# Patient Record
Sex: FEMALE | Race: WHITE | NOT HISPANIC OR LATINO | URBAN - METROPOLITAN AREA
[De-identification: names, ages, dates, MRNs, and addresses within clinical notes are randomized per-mention and may not be internally consistent; named-entity substitution may affect disease eponyms.]

---

## 2023-07-26 ENCOUNTER — TELEMEDICINE (OUTPATIENT)
Dept: PRIMARY CARE | Facility: CLINIC | Age: 43
End: 2023-07-26
Payer: COMMERCIAL

## 2023-07-26 DIAGNOSIS — L03.011 CELLULITIS OF FINGER OF RIGHT HAND: Primary | ICD-10-CM

## 2023-07-26 PROCEDURE — 99213 OFFICE O/P EST LOW 20 MIN: CPT | Performed by: FAMILY MEDICINE

## 2023-07-26 RX ORDER — CEPHALEXIN 250 MG/1
250 CAPSULE ORAL 3 TIMES DAILY
Qty: 21 CAPSULE | Refills: 0 | Status: SHIPPED | OUTPATIENT
Start: 2023-07-26 | End: 2023-08-02

## 2023-07-26 NOTE — PROGRESS NOTES
S: right index finger : reddish purple , tender.  Started as blister, covered with blister bad aid, when band aid was removed, skin peeled off.    Marta graff, admits to attending to roses before onset of symptoms  Denies nail problems, red streaking, pain with finger movement, joint swelling.    O: purple discoloration dorsal aspect of right index finger mid to distal phalanx, with superficial skin peeled   Demonstrate normal ROM at PIP and DIP  No nail involvement.    A/P : finger cellulitis with no concern for tenosynovial infection on exam.  Start cephalexin  Obtain x-ray to r/o FB  Symptoms worsen : go to ER    Possibility of spriotrichosis can not be rule out. Recommended follow up with  in 3 to 5 days